# Patient Record
Sex: MALE | Race: BLACK OR AFRICAN AMERICAN | URBAN - METROPOLITAN AREA
[De-identification: names, ages, dates, MRNs, and addresses within clinical notes are randomized per-mention and may not be internally consistent; named-entity substitution may affect disease eponyms.]

---

## 2019-04-24 ENCOUNTER — OFFICE VISIT (OUTPATIENT)
Dept: URGENT CARE | Facility: URGENT CARE | Age: 24
End: 2019-04-24

## 2019-04-24 VITALS
WEIGHT: 168 LBS | DIASTOLIC BLOOD PRESSURE: 74 MMHG | RESPIRATION RATE: 18 BRPM | HEART RATE: 70 BPM | OXYGEN SATURATION: 100 % | TEMPERATURE: 98.2 F | SYSTOLIC BLOOD PRESSURE: 106 MMHG

## 2019-04-24 DIAGNOSIS — R10.84 ABDOMINAL PAIN, GENERALIZED: Primary | ICD-10-CM

## 2019-04-24 PROCEDURE — 99203 OFFICE O/P NEW LOW 30 MIN: CPT | Performed by: NURSE PRACTITIONER

## 2019-04-24 RX ORDER — ATOMOXETINE 80 MG/1
80 CAPSULE ORAL
COMMUNITY
Start: 2018-01-18 | End: 2019-04-24

## 2019-04-24 ASSESSMENT — ENCOUNTER SYMPTOMS
FREQUENCY: 0
RECTAL PAIN: 0
FATIGUE: 0
DYSURIA: 0
BLOOD IN STOOL: 0
DIARRHEA: 0
ABDOMINAL DISTENTION: 0
NAUSEA: 0
APPETITE CHANGE: 0
VOMITING: 0
CONSTIPATION: 0
ABDOMINAL PAIN: 1
FEVER: 0
ACTIVITY CHANGE: 0
SHORTNESS OF BREATH: 0

## 2019-04-24 ASSESSMENT — PAIN SCALES - GENERAL: PAINLEVEL: MODERATE PAIN (4)

## 2019-04-25 NOTE — PROGRESS NOTES
"SUBJECTIVE:   Raji Cross is a 23 year old male presenting with a chief complaint of   Chief Complaint   Patient presents with     Abdominal Pain     UPPER MIDDLE QUAD- STARTED TODAY- AND GOES UP AND TO LEFT SIDE       He is a new patient of [facility].    Abdominal Pain    Location: periumbilical   Radiation: to left side of abdomen    Pain character: sharp and stabbing,   Severity: 5 on a scale of 1-10.  However, father with patient notes, likely worse as patient is \"too stoic\"  Duration: 5 hours ago after he stopped running. Reports a \"normal run\" no increased intensity or injury  Course of Illness: rapidly progressive and persistant  Exacerbated by: nothing  Relieved by: nothing  Associated Symptoms: none.  Female : Not applicable  Surgical History: none  Denies any recent abdominal injury or strenuous exercises. Denies any new medications. Denies any OTC medications.       Review of Systems   Constitutional: Negative for activity change, appetite change, fatigue and fever.   Respiratory: Negative for shortness of breath.    Gastrointestinal: Positive for abdominal pain. Negative for abdominal distention, blood in stool, constipation, diarrhea, nausea, rectal pain and vomiting.   Genitourinary: Negative for dysuria, frequency and urgency.   Skin: Negative.    All other systems reviewed and are negative.      No past medical history on file.  History reviewed. No pertinent family history.  No current outpatient medications on file.     Social History     Tobacco Use     Smoking status: Former Smoker     Smokeless tobacco: Never Used   Substance Use Topics     Alcohol use: Not on file       OBJECTIVE  /74 (BP Location: Left arm, Patient Position: Chair, Cuff Size: Adult Regular)   Pulse 70   Temp 98.2  F (36.8  C) (Oral)   Resp 18   Wt 76.2 kg (168 lb)   SpO2 100%     Physical Exam   Constitutional: He is oriented to person, place, and time. No distress.   Cardiovascular: Normal rate, regular rhythm, " normal heart sounds and intact distal pulses. Exam reveals no gallop and no friction rub.   No murmur heard.  Pulmonary/Chest: Effort normal and breath sounds normal. No stridor. No respiratory distress. He has no wheezes.   Abdominal: Soft. He exhibits no distension and no mass. There is tenderness. There is guarding. There is no rebound. No hernia.   Decreased bowel sounds in all four quadrants  Generalized abdominal pain, unable to localize due to significant pain on examination   Neurological: He is alert and oriented to person, place, and time.   Skin: Skin is warm. Capillary refill takes less than 2 seconds. He is not diaphoretic. No pallor.   Psychiatric: He has a normal mood and affect.       Labs:  No results found for this or any previous visit (from the past 24 hour(s)).    ASSESSMENT:    ICD-10-CM    1. Abdominal pain, generalized R10.84         Medical Decision Making:    Differential Diagnosis:  Abdominal Pain: GB/Cholelithiasis, Appendix, Diverticular Disease, Bowel Obstruction and Non Specific    Serious Comorbid Conditions:  Adult:  None    PLAN: Discussed with patient that given sudden onset, clinical presentation patient warrants further evaluation for an acute abdominal process with an ABD CT Scan. Patient and father referred to [facility] now for further evaluation, transport via personal car, no further questions and agreed with plan of care. Report called to [facility] to Dr. Heart.     SVETA Soto, CNP      There are no Patient Instructions on file for this visit.